# Patient Record
(demographics unavailable — no encounter records)

---

## 2025-07-02 NOTE — PHYSICAL EXAM
[TextEntry] :   GENERAL: Appears in no acute distress HEENT: EOMI. No conjunctival erythema. Moist mucous membranes. No nasopharyngeal ulcers NECK: Supple, no cervical lymphadenopathy CARDIOVASCULAR: RRR. S1, S2 auscultated. No murmurs or rubs. PULMONARY: Clear to auscultation b/l, no wheezes, rales, or crackles ABDOMINAL: Soft, nontender, nondistended. Bowel sounds present.  MSK: No active synovitis, swelling, erythema, or warmth. No joint tenderness to palpation. +Heberdens nodes. No deformities. No crepitus. Normal ROM of neck, back, b/l upper and lower extremities. SKIN: No lesions or rashes NEURO: No focal deficits. Motor strength 5/5 in major muscle groups of b/l UE and LE. Sensation to soft touch intact in major dermatomes of b/l UE and LE. PSYCH: . Normal affect and thought process.

## 2025-07-02 NOTE — HISTORY OF PRESENT ILLNESS
[FreeTextEntry1] : 49 yr old F   Referred by PCP for joint pain She has pain in fingers, neck, legs - reports pain all over her body She has had had pain since at least 2018  reports right 3rd finger gets stuck at times sometimes numbness/tingling in her fingers in the AM  Patient denies joint pains, joint swelling, joint erythema/warmth,  fatigue, fever, chills, weight loss, nasopharyngeal ulcers, chest pain, abdominal pain, , cough, SOB, nausea, vomiting, diarrhea, constipation, blood in stool, dysuria, hematuria, rash, photosensitivity, Raynaud's, alopecia, dry eyes, dry mouth, dry skin,, eye pain/redness, vision changes, myalgias, muscle weakness, dysphagia, , miscarriages, Hx of DVT/PEs.     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

## 2025-07-02 NOTE — ASSESSMENT
[FreeTextEntry1] : 49 yr old F   Referred by PCP for joint pain She has pain in fingers, neck, legs - reports pain all over her body She has had had pain since at least 2018  reports right 3rd finger gets stuck at times sometimes numbness/tingling in her fingers in the AM  Patient does not have signs of  including inflammatory joint pain, malar rash, photosensitivity, sicca symptoms, Raynaud Exam without synovitis, rashes, changes of Raynauds.  Given joint pain, will do work up for underlying autoimmune Connective tissue disease Will also obtain xrays. She might have myofascial pain and arthritis due to nature of her job Meanwhile, Tylenol arthritis as needed for pain and focus on muscle strengthen exercises and stretches      Total time spent in review of patient history, clinical exam, management, counseling, and plan of care:  50min  Follow up 1month

## 2025-07-16 NOTE — ASSESSMENT
[FreeTextEntry1] :    49 yr old F  Referred by PCP for joint pain  She has pain in fingers, neck, legs - reports pain all over her body  She has had had pain since at least 2018  reports right 3rd finger gets stuck at times  sometimes numbness/tingling in her fingers in the AM    Patient does not have signs of including inflammatory joint pain, malar rash, photosensitivity, sicca symptoms, Raynaud Exam without synovitis, rashes, changes of Raynauds.  Given joint pain, did work up for underlying autoimmune Connective tissue disease - normal serologies Causes of high ESR include systemic inflammatory disease, infection, malignancy. ESR is also higher with increasing age, female sex, anemia, renal disease, and obesity. No evidence of inflammation on my physical exam or imaging, she is pending u/s hands  She  has myofascial pain and arthritis due to nature of her job - She is going to be following up with orthopedics for right shoulder calcific tendinitis NSAIDs/Tylenol arthritis as needed for pain and focus on muscle strengthening exercises and stretches  Also advised PT and avoiding activities that put stress on joint I will give her prescription for meloxicam Reviewed side effects of meloxicam in detail    Total time spent in review of patient history, clinical exam, management, counseling, and plan of care:  30min

## 2025-07-16 NOTE — HISTORY OF PRESENT ILLNESS
[FreeTextEntry1] : 49 yr old F  Referred by PCP for joint pain- seen July 2 2025   She has pain in fingers, neck, legs - reports pain all over her body  She has had had pain since at least 2018  reports right 3rd finger gets stuck at times  sometimes numbness/tingling in her fingers in the AM   Patient denies joint pains, joint swelling, joint erythema/warmth, fatigue, fever, chills, weight loss, nasopharyngeal ulcers, chest pain, abdominal pain, , cough, SOB, nausea, vomiting, diarrhea, constipation, blood in stool, dysuria, hematuria, rash, photosensitivity, Raynaud's, alopecia, dry eyes, dry mouth, dry skin,, eye pain/redness, vision changes, myalgias, muscle weakness, dysphagia, , miscarriages, Hx of DVT/PEs.     Since initial visit, had rheumatological With negative serologies Reviewed imaging and all labs with the patient in detail Patient reports she was in the ER yesterday for right shoulder pain-found to have calcific tendinitis and was referred to orthopedics by the ER Was given Toradol injection in the ER  7/2025  Labs  Neg THAD, RF, CCP, SSA, SSB, Smith, RNP, C3,C4, dsDNA  CRP 6 ESR 50   Imaging  07/10/2025   IMPRESSION:  No fractures or dislocations.  Preserved joint spaces and no joint margin erosions.  Carpal bones normally aligned.  Neutral ulnar variance.  No lytic or blastic lesions.   xr shoulder     IMPRESSION:  No fractures, dislocations, or AC separation. Preserved joint spaces and smooth articular margins. Apparent spinal degenerative changes. Inferiorly directed hypertrophic osteophyte formation projects from right 1st costomanubrial junction.  Maintained subacromial and coracoclavicular spaces.  No destructive osseous lesions or periosteal reaction.  No periarticular soft tissue calcifications.      Xr knee   IMPRESSION:  No fractures, dislocations, or joint effusions.  Preserved joint spaces with smooth and intact articular surfaces. No joint margin erosions or intra-articular or periarticular calcifications.  Protuberant bilateral superior patellar enthesophytes.  No destructive osseous lesions or periosteal reaction.      Xr lumbar spine   IMPRESSION:  No compression fractures, spondylolistheses, or spondylolysis defects.  Disk space heights preserved and facet alignment maintained.  Unremarkable SI joints and partially visualized hips.  No lytic or blastic lesions.      Cervical spine   Prevertebral soft tissues and predental interval within normal limits.  Atlantoaxial and posterior facet alignment maintained.  Intact odontoid.  No discrete lytic or blastic lesions.  Linear and nodular  nuchal ossifications in posterior neck soft tissues over C4-C6 spinous processes.  Clear visualized lung apices and midline normal caliber trachea.

## 2025-07-16 NOTE — PHYSICAL EXAM
[TextEntry] : GENERAL: Appears in no acute distress HEENT: EOMI. No conjunctival erythema. Moist mucous membranes. No nasopharyngeal ulcers NECK: Supple, no cervical lymphadenopathy CARDIOVASCULAR: RRR. S1, S2 auscultated.  PULMONARY: Clear to auscultation b/l, no wheezes, rales, or crackles ABDOMINAL: Soft, nontender, nondistended. Bowel sounds present. MSK: No active synovitis, swelling, erythema, or warmth. No joint tenderness to palpation. +Heberdens nodes. No deformities. No crepitus. SKIN: No lesions or rashes NEURO: No focal deficits. Motor strength 5/5 in major muscle groups of b/l UE and LE. PSYCH: . Normal affect and thought process.